# Patient Record
Sex: MALE | Race: WHITE
[De-identification: names, ages, dates, MRNs, and addresses within clinical notes are randomized per-mention and may not be internally consistent; named-entity substitution may affect disease eponyms.]

---

## 2020-03-10 ENCOUNTER — HOSPITAL ENCOUNTER (OUTPATIENT)
Dept: HOSPITAL 53 - M IRPOV | Age: 85
End: 2020-03-10
Attending: RADIOLOGY
Payer: MEDICARE

## 2020-03-10 VITALS — SYSTOLIC BLOOD PRESSURE: 159 MMHG | DIASTOLIC BLOOD PRESSURE: 69 MMHG

## 2020-03-10 VITALS — HEIGHT: 68 IN | WEIGHT: 147.27 LBS | BODY MASS INDEX: 22.32 KG/M2

## 2020-03-10 DIAGNOSIS — Z96.652: ICD-10-CM

## 2020-03-10 DIAGNOSIS — I48.91: ICD-10-CM

## 2020-03-10 DIAGNOSIS — Z86.73: ICD-10-CM

## 2020-03-10 DIAGNOSIS — I70.222: ICD-10-CM

## 2020-03-10 DIAGNOSIS — Z79.899: ICD-10-CM

## 2020-03-10 DIAGNOSIS — E78.5: ICD-10-CM

## 2020-03-10 DIAGNOSIS — E11.51: ICD-10-CM

## 2020-03-10 DIAGNOSIS — L97.429: ICD-10-CM

## 2020-03-10 DIAGNOSIS — I10: ICD-10-CM

## 2020-03-10 DIAGNOSIS — I70.244: Primary | ICD-10-CM

## 2020-03-10 DIAGNOSIS — Z87.891: ICD-10-CM

## 2020-03-10 DIAGNOSIS — Z79.01: ICD-10-CM

## 2020-03-11 NOTE — IRCOV
[facility] IR Consult Office Visit


IR Consult Office Visit


DATE:  Mar 10, 2020


REASON FOR CONSULTATION/CHIEF COMPLAINT: Rest pain. 





HISTORY OF PRESENT ILLNESS: 85-year-old male with history of TIAs and atrial 

fibrillation, presents for left lower extremity rest pain and pallor on 

elevation. He has a stage II pressure left heel ulcer, refractory to treatment. 

He is diabetic with good sugar control. He is under the care of wound care. 

Patient does not walk much, denies intermittent claudication. However does 

describe pain in the legs in the night after sleeping for 2-3 hours for which he

has to get up and hang his legs over the side of the bed. He has had prior open-

heart surgery and valve replacement and heart bypass. Patient denies chest pain,

shortness of breath, orthopnea and paroxysmal nocturnal dyspnea. He quit smoking

40 years ago. He does suffer with hypertension and hyperlipidemia. 





ALLERGIES: Please see below.





HOME MEDICATIONS: Please see below.





PAST MEDICAL HISTORY:


Diabetes





Hypertension





TIA





Atrial fibrillation





Open-heart surgery





Aortic valve replacement





PAST SURGICAL HISTORY: 


Open-heart surgery





Left knee replacement





Basal cell carcinoma removal





Carpal tunnel





FAMILY HISTORY: Noncontributory





SOCIAL HISTORY: Ex-smoker. No drugs or alcohol.





REVIEW OF SYSTEMS: Otherwise negative.





PHYSICAL EXAMINATION:


VITAL SIGNS: Please see below.


GENERAL APPEARANCE: Appears well. Comfortable at rest.


HEENT: No scleral icterus.


RESPIRATORY: Normal breathing at rest.


CARDIOVASCULAR: Normal rate.


ABDOMEN: Non-distended.


EXTREMITIES: Left lower extremity: Red. Warm. Hairless. Shiny. Superficial shin 

blisters with weeping. DP/PT negative. popliteal 1+





Right lower extremity: Mild edema. Punctate wounds on the shin. Bulging varicose

veins. Warm. DP/PT negative. popliteal 1 +


NEUROLOGICAL: Alert and oriented. 


PSYCHIATRIC: Appropriate to circumstance.





LABORATORY DATA: None





Imaging: None





ASSESSMENT/PLAN: 85-year-old diabetic male with atrial fibrillation and prior 

TIAs, with coronary risk factors presents with left lower extremity rest pain. 

Patient has critical limb ischemia and indication for angiography and/or 

intervention as appropriate. However, patient does not want to stop Coumadin for

angiography due to risk of stroke and would rather have a CTA for now to assess 

baseline and need. We have scheduled the patient for the CTA exam with bilateral

lower extremity runoff and will follow up with the patient once this is done.





I spent 30 minutes in consultation with the patient. 





Thank you for this referral.





Cc Dr. Stillerman





VS, I&O, 24H, Fishbone


Vital Signs/I&O





Vital Signs








  Date Time  Temp Pulse Resp B/P (MAP) Pulse Ox O2 Delivery O2 Flow Rate FiO2


 


3/10/20 12:10 97.1 73 18 159/69 (99) 98 Room Air  

















MARLENE VELOZ MD               Mar 11, 2020 15:54

## 2020-03-27 ENCOUNTER — HOSPITAL ENCOUNTER (OUTPATIENT)
Dept: HOSPITAL 53 - M WUC | Age: 85
End: 2020-03-27
Attending: DERMATOLOGY
Payer: MEDICARE

## 2020-03-27 DIAGNOSIS — L12.0: Primary | ICD-10-CM

## 2020-03-27 DIAGNOSIS — L97.819: ICD-10-CM

## 2020-03-27 PROCEDURE — 36415 COLL VENOUS BLD VENIPUNCTURE: CPT

## 2020-03-27 PROCEDURE — 83516 IMMUNOASSAY NONANTIBODY: CPT

## 2020-06-05 ENCOUNTER — HOSPITAL ENCOUNTER (OUTPATIENT)
Dept: HOSPITAL 53 - M WUC | Age: 85
End: 2020-06-05
Attending: DERMATOLOGY
Payer: MEDICARE

## 2020-06-05 DIAGNOSIS — L12.0: Primary | ICD-10-CM

## 2020-06-05 LAB
ALBUMIN SERPL BCG-MCNC: 3.2 GM/DL (ref 3.2–5.2)
ALT SERPL W P-5'-P-CCNC: 29 U/L (ref 12–78)
BASOPHILS # BLD AUTO: 0 10^3/UL (ref 0–0.2)
BASOPHILS NFR BLD AUTO: 0.2 % (ref 0–1)
BILIRUB SERPL-MCNC: 0.4 MG/DL (ref 0.2–1)
BUN SERPL-MCNC: 46 MG/DL (ref 7–18)
CALCIUM SERPL-MCNC: 8.2 MG/DL (ref 8.8–10.2)
CHLORIDE SERPL-SCNC: 104 MEQ/L (ref 98–107)
CO2 SERPL-SCNC: 22 MEQ/L (ref 21–32)
CREAT SERPL-MCNC: 1.54 MG/DL (ref 0.7–1.3)
EOSINOPHIL # BLD AUTO: 0 10^3/UL (ref 0–0.5)
EOSINOPHIL NFR BLD AUTO: 0.1 % (ref 0–3)
GFR SERPL CREATININE-BSD FRML MDRD: 45.8 ML/MIN/{1.73_M2} (ref 35–?)
GLUCOSE SERPL-MCNC: 249 MG/DL (ref 70–100)
HCT VFR BLD AUTO: 30.8 % (ref 42–52)
HGB BLD-MCNC: 9.8 G/DL (ref 13.5–17.5)
LYMPHOCYTES # BLD AUTO: 0.6 10^3/UL (ref 1.5–5)
LYMPHOCYTES NFR BLD AUTO: 6.2 % (ref 24–44)
MCH RBC QN AUTO: 28.7 PG (ref 27–33)
MCHC RBC AUTO-ENTMCNC: 31.8 G/DL (ref 32–36.5)
MCV RBC AUTO: 90.3 FL (ref 80–96)
MONOCYTES # BLD AUTO: 0.4 10^3/UL (ref 0–0.8)
MONOCYTES NFR BLD AUTO: 3.7 % (ref 0–5)
NEUTROPHILS # BLD AUTO: 8.3 10^3/UL (ref 1.5–8.5)
NEUTROPHILS NFR BLD AUTO: 88.2 % (ref 36–66)
PLATELET # BLD AUTO: 300 10^3/UL (ref 150–450)
POTASSIUM SERPL-SCNC: 5.5 MEQ/L (ref 3.5–5.1)
PROT SERPL-MCNC: 5.6 GM/DL (ref 6.4–8.2)
RBC # BLD AUTO: 3.41 10^6/UL (ref 4.3–6.1)
SODIUM SERPL-SCNC: 137 MEQ/L (ref 136–145)
WBC # BLD AUTO: 9.4 10^3/UL (ref 4–10)

## 2020-06-05 PROCEDURE — 87340 HEPATITIS B SURFACE AG IA: CPT

## 2020-06-05 PROCEDURE — 87389 HIV-1 AG W/HIV-1&-2 AB AG IA: CPT

## 2020-06-05 PROCEDURE — 86709 HEPATITIS A IGM ANTIBODY: CPT

## 2020-06-05 PROCEDURE — 85025 COMPLETE CBC W/AUTO DIFF WBC: CPT

## 2020-06-05 PROCEDURE — 80053 COMPREHEN METABOLIC PANEL: CPT

## 2020-06-05 PROCEDURE — 86803 HEPATITIS C AB TEST: CPT

## 2020-06-05 PROCEDURE — 36415 COLL VENOUS BLD VENIPUNCTURE: CPT

## 2020-06-05 PROCEDURE — 86705 HEP B CORE ANTIBODY IGM: CPT

## 2020-06-05 PROCEDURE — 86480 TB TEST CELL IMMUN MEASURE: CPT

## 2020-06-08 LAB
HBV CORE IGM SER QL: NEGATIVE
HBV SURFACE AB SER-ACNC: NEGATIVE M[IU]/ML
HCV AB SER QL: 0.3 INDEX (ref ?–0.8)
HEPATITIS A ANTIBODY IGM: NEGATIVE
HIV 1+2 AB+HIV1 P24 AG SERPL QL IA: NEGATIVE

## 2020-07-02 ENCOUNTER — HOSPITAL ENCOUNTER (OUTPATIENT)
Dept: HOSPITAL 53 - M LAB REF | Age: 85
End: 2020-07-02
Attending: DERMATOLOGY
Payer: MEDICARE

## 2020-07-02 DIAGNOSIS — L12.0: Primary | ICD-10-CM

## 2020-07-02 PROCEDURE — 87077 CULTURE AEROBIC IDENTIFY: CPT

## 2020-07-02 PROCEDURE — 87186 SC STD MICRODIL/AGAR DIL: CPT

## 2020-07-02 PROCEDURE — 87070 CULTURE OTHR SPECIMN AEROBIC: CPT
